# Patient Record
Sex: FEMALE | Race: WHITE
[De-identification: names, ages, dates, MRNs, and addresses within clinical notes are randomized per-mention and may not be internally consistent; named-entity substitution may affect disease eponyms.]

---

## 2019-09-19 ENCOUNTER — HOSPITAL ENCOUNTER (OUTPATIENT)
Dept: HOSPITAL 92 - DTY/OP | Age: 34
Discharge: HOME | End: 2019-09-19
Attending: SURGERY
Payer: COMMERCIAL

## 2019-09-19 DIAGNOSIS — E66.01: Primary | ICD-10-CM

## 2019-09-19 PROCEDURE — 97802 MEDICAL NUTRITION INDIV IN: CPT

## 2019-12-06 ENCOUNTER — HOSPITAL ENCOUNTER (INPATIENT)
Dept: HOSPITAL 92 - SURG A | Age: 34
LOS: 14 days | Discharge: HOME | DRG: 621 | End: 2019-12-20
Attending: SURGERY | Admitting: SURGERY
Payer: COMMERCIAL

## 2019-12-06 ENCOUNTER — HOSPITAL ENCOUNTER (OUTPATIENT)
Dept: HOSPITAL 92 - LABBT | Age: 34
Discharge: HOME | End: 2019-12-06
Attending: SURGERY
Payer: COMMERCIAL

## 2019-12-06 VITALS — BODY MASS INDEX: 42.7 KG/M2

## 2019-12-06 DIAGNOSIS — E66.01: Primary | ICD-10-CM

## 2019-12-06 DIAGNOSIS — F17.200: ICD-10-CM

## 2019-12-06 DIAGNOSIS — E11.9: ICD-10-CM

## 2019-12-06 DIAGNOSIS — Z01.818: Primary | ICD-10-CM

## 2019-12-06 DIAGNOSIS — F32.9: ICD-10-CM

## 2019-12-06 DIAGNOSIS — E66.01: ICD-10-CM

## 2019-12-06 DIAGNOSIS — Z79.4: ICD-10-CM

## 2019-12-06 DIAGNOSIS — F41.9: ICD-10-CM

## 2019-12-06 DIAGNOSIS — J30.2: ICD-10-CM

## 2019-12-06 LAB
ALBUMIN SERPL BCG-MCNC: 4.6 G/DL (ref 3.5–5)
ALP SERPL-CCNC: 78 U/L (ref 40–110)
ALT SERPL W P-5'-P-CCNC: 16 U/L (ref 8–55)
ANION GAP SERPL CALC-SCNC: 13 MMOL/L (ref 10–20)
AST SERPL-CCNC: 20 U/L (ref 5–34)
BASOPHILS # BLD AUTO: 0 THOU/UL (ref 0–0.2)
BASOPHILS NFR BLD AUTO: 0.4 % (ref 0–1)
BILIRUB DIRECT SERPL-MCNC: 0.1 MG/DL (ref 0.1–0.3)
BILIRUB SERPL-MCNC: 0.3 MG/DL (ref 0.2–1.2)
BUN SERPL-MCNC: 23 MG/DL (ref 7–18.7)
CALCIUM SERPL-MCNC: 9.8 MG/DL (ref 7.8–10.44)
CHLORIDE SERPL-SCNC: 102 MMOL/L (ref 98–107)
CO2 SERPL-SCNC: 26 MMOL/L (ref 22–29)
CREAT CL PREDICTED SERPL C-G-VRATE: 0 ML/MIN (ref 70–130)
EOSINOPHIL # BLD AUTO: 0.2 THOU/UL (ref 0–0.7)
EOSINOPHIL NFR BLD AUTO: 2.5 % (ref 0–10)
GLOBULIN SER CALC-MCNC: 3 G/DL (ref 2.4–3.5)
GLUCOSE SERPL-MCNC: 98 MG/DL (ref 70–105)
HGB BLD-MCNC: 11.9 G/DL (ref 12–16)
LYMPHOCYTES # BLD: 2.2 THOU/UL (ref 1.2–3.4)
LYMPHOCYTES NFR BLD AUTO: 23.5 % (ref 21–51)
MCH RBC QN AUTO: 26.5 PG (ref 27–31)
MCV RBC AUTO: 80.9 FL (ref 78–98)
MONOCYTES # BLD AUTO: 0.8 THOU/UL (ref 0.11–0.59)
MONOCYTES NFR BLD AUTO: 8.8 % (ref 0–10)
NEUTROPHILS # BLD AUTO: 5.9 THOU/UL (ref 1.4–6.5)
NEUTROPHILS NFR BLD AUTO: 64.9 % (ref 42–75)
PLATELET # BLD AUTO: 362 THOU/UL (ref 130–400)
POTASSIUM SERPL-SCNC: 4 MMOL/L (ref 3.5–5.1)
PREGS CONTROL BACKGROUND?: (no result)
PREGS CONTROL BAR APPEAR?: YES
RBC # BLD AUTO: 4.5 MILL/UL (ref 4.2–5.4)
SODIUM SERPL-SCNC: 137 MMOL/L (ref 136–145)
WBC # BLD AUTO: 9.2 THOU/UL (ref 4.8–10.8)

## 2019-12-06 PROCEDURE — 74241: CPT

## 2019-12-06 PROCEDURE — G0008 ADMIN INFLUENZA VIRUS VAC: HCPCS

## 2019-12-06 PROCEDURE — 84703 CHORIONIC GONADOTROPIN ASSAY: CPT

## 2019-12-06 PROCEDURE — C9113 INJ PANTOPRAZOLE SODIUM, VIA: HCPCS

## 2019-12-06 PROCEDURE — 80076 HEPATIC FUNCTION PANEL: CPT

## 2019-12-06 PROCEDURE — S0028 INJECTION, FAMOTIDINE, 20 MG: HCPCS

## 2019-12-06 PROCEDURE — 36416 COLLJ CAPILLARY BLOOD SPEC: CPT

## 2019-12-06 PROCEDURE — S0020 INJECTION, BUPIVICAINE HYDRO: HCPCS

## 2019-12-06 PROCEDURE — 36415 COLL VENOUS BLD VENIPUNCTURE: CPT

## 2019-12-06 PROCEDURE — 80053 COMPREHEN METABOLIC PANEL: CPT

## 2019-12-06 PROCEDURE — 88307 TISSUE EXAM BY PATHOLOGIST: CPT

## 2019-12-06 PROCEDURE — 83036 HEMOGLOBIN GLYCOSYLATED A1C: CPT

## 2019-12-06 PROCEDURE — 85025 COMPLETE CBC W/AUTO DIFF WBC: CPT

## 2019-12-06 PROCEDURE — 71046 X-RAY EXAM CHEST 2 VIEWS: CPT

## 2019-12-06 PROCEDURE — 90471 IMMUNIZATION ADMIN: CPT

## 2019-12-06 PROCEDURE — 90686 IIV4 VACC NO PRSV 0.5 ML IM: CPT

## 2019-12-06 PROCEDURE — 80048 BASIC METABOLIC PNL TOTAL CA: CPT

## 2019-12-06 PROCEDURE — 88312 SPECIAL STAINS GROUP 1: CPT

## 2019-12-06 NOTE — RAD
Chest 2 views



HISTORY: Preop.



FINDINGS: Cardiac silhouette and pulmonary vasculature are unremarkable. Mediastinum is midline. No c
onfluent airspace consolidation, pneumothorax, or pleural fluid.











IMPRESSION: Normal exam.



Reported By: MEJIA Daly 

Electronically Signed:  12/6/2019 5:56 PM

## 2019-12-19 PROCEDURE — 0DB64Z3 EXCISION OF STOMACH, PERCUTANEOUS ENDOSCOPIC APPROACH, VERTICAL: ICD-10-PCS | Performed by: SURGERY

## 2019-12-19 RX ADMIN — POTASSIUM CHLORIDE AND SODIUM CHLORIDE SCH MLS: 450; 150 INJECTION, SOLUTION INTRAVENOUS at 21:19

## 2019-12-19 RX ADMIN — CEFAZOLIN SODIUM SCH MLS: 2 SOLUTION INTRAVENOUS at 21:20

## 2019-12-19 RX ADMIN — CEFAZOLIN SODIUM SCH MLS: 2 SOLUTION INTRAVENOUS at 15:31

## 2019-12-19 RX ADMIN — POTASSIUM CHLORIDE AND SODIUM CHLORIDE SCH MLS: 450; 150 INJECTION, SOLUTION INTRAVENOUS at 11:23

## 2019-12-19 RX ADMIN — POTASSIUM CHLORIDE AND SODIUM CHLORIDE SCH MLS: 450; 150 INJECTION, SOLUTION INTRAVENOUS at 17:33

## 2019-12-19 NOTE — HP
CHIEF COMPLAINT:  Morbid obesity.



HISTORY OF PRESENT ILLNESS:  The patient is a 34-year-old female, who has been

morbidly obese for many years, attempted multiple weight loss programs without

success.  She is here for sleeve gastrectomy. 



PAST MEDICAL HISTORY:  Significant for depression, anxiety, seasonal allergies, and

diabetes. 



PAST SURGICAL HISTORY:  Tonsillectomy,  section, and tubal reversal.



MEDICATIONS:  

1. BD pen needle insulin.

2. Tradjenta.



FAMILY HISTORY:  Skin cancer, hearing loss, and hypothyroidism.



SOCIAL HISTORY:  She is .  No alcohol or tobacco.



ALLERGIES:  SHE HAS NO KNOWN DRUG ALLERGIES.



PHYSICAL EXAMINATION:

VITAL SIGNS:  Height 64, weight 253, body mass index 43.5. 

GENERAL:  Well-developed, well-nourished female, in no apparent distress. 

HEENT:  No alopecia.  Pupils equal, round, and reactive.  Extraocular motor intact.

Pharynx clear.  Good dentition. 

NECK:  Supple.  No thyroid masses.  No carotid bruits. 

LUNGS:  Clear. 

HEART:  Regular rate and rhythm. 

BREASTS:  No palpable breast masses.  No lymphadenopathy.  No skin changes. 

ABDOMEN:  Soft, nondistended, and nontender.  No masses or hernias. 

EXTREMITIES:  Good pulses.  No pedal edema.



ASSESSMENT:  Morbid obesity with comorbidities.



PLAN:  Laparoscopic sleeve gastrectomy.



CONSENT:  I have discussed planned procedure as well as risk of bleeding, infection,

injury to esophagus, spleen, loops of bowel, need to open, also leakage from staple

line.  She understands and gives informed consent. 







Job ID:  [id number]

## 2019-12-19 NOTE — OP
DATE OF PROCEDURE:  12/19/2019



PREOPERATIVE DIAGNOSIS:  Morbid obesity.



PROCEDURES PERFORMED:  Laparoscopic sleeve gastrectomy, esophagogastroscopy.



INDICATIONS:  A 34-year-old female morbidly obese, who has attempted multiple weight

loss programs without success. 



FINDINGS:  A 38-Kosovan bougie used.



DESCRIPTION OF PROCEDURE:  After informed consent was obtained, the patient was

taken to the operating room, given general endotracheal anesthesia and placed in

supine position.  Abdomen was prepped and draped in usual fashion.  Local anesthesia

was infiltrated subcutaneously and deep, and a 12 mm incision was performed

approximately 8-inch below the xiphoid slight to left.  Veress needle inserted.

Drop test performed.  Pneumoperitoneum was created to a volume of 2 L of carbon

dioxide.  Utilizing a bladeless 12 mm trocar and 0-degree laparoscope, direct visual

entry into the abdominal cavity was performed.  Pneumoperitoneum was created to a

pressure of 15 mmHg, and the patient was placed in steep reverse Trendelenburg

position.  Ameena liver retractor was inserted.  Left lobe of the liver retracted

superiorly.  Pylorus identified a 12 mm port placed on the right beneath it and two

12s placed in left subcostal.  The omentum was taken off the greater curvature 5 cm

from the pylorus utilizing the LigaSure.  Short gastrics divided with LigaSure, and

left crura defined with LigaSure.  A 38-K[place] bougie was inserted, directed into

the antrum.  The linear 60 mm green load stapler was used to divide the antrum to

the bougie, gold load along the bougie, and a series of blues through the angle of

His.  Intraoperative endoscopy was performed.  The video endoscope was inserted

under direct vision.  Staple line inspected.  There was no bleeding.  Staple line

was then tested by inflating the new stomach with pressurized air under water, there

was no air leak.  Stomach was decompressed.  Scope was removed.  The remnant stomach

was removed from the abdomen through the left lateral port site. 

The fascia was closed with 0 Vicryl suture and the GraNee needle.  Trocars and

retractors were removed.  The skin was closed with interrupted 4-0 Rapide. 

Dermabond was applied.  The patient tolerated the procedure well, transferred to

Recovery in good condition.  Sponge and needle count verified correct x2. 







Job ID:  [id number]

## 2019-12-20 VITALS — SYSTOLIC BLOOD PRESSURE: 127 MMHG | DIASTOLIC BLOOD PRESSURE: 84 MMHG | TEMPERATURE: 98.1 F

## 2019-12-20 LAB
ANION GAP SERPL CALC-SCNC: 10 MMOL/L (ref 10–20)
BASOPHILS # BLD AUTO: 0 THOU/UL (ref 0–0.2)
BASOPHILS NFR BLD AUTO: 0.4 % (ref 0–1)
BUN SERPL-MCNC: 10 MG/DL (ref 7–18.7)
CALCIUM SERPL-MCNC: 8.8 MG/DL (ref 7.8–10.44)
CHLORIDE SERPL-SCNC: 106 MMOL/L (ref 98–107)
CO2 SERPL-SCNC: 25 MMOL/L (ref 22–29)
CREAT CL PREDICTED SERPL C-G-VRATE: 194 ML/MIN (ref 70–130)
EOSINOPHIL # BLD AUTO: 0 THOU/UL (ref 0–0.7)
EOSINOPHIL NFR BLD AUTO: 0.3 % (ref 0–10)
GLUCOSE SERPL-MCNC: 83 MG/DL (ref 70–105)
HGB BLD-MCNC: 10.9 G/DL (ref 12–16)
LYMPHOCYTES # BLD: 2.3 THOU/UL (ref 1.2–3.4)
LYMPHOCYTES NFR BLD AUTO: 21.3 % (ref 21–51)
MCH RBC QN AUTO: 26 PG (ref 27–31)
MCV RBC AUTO: 81.7 FL (ref 78–98)
MONOCYTES # BLD AUTO: 1 THOU/UL (ref 0.11–0.59)
MONOCYTES NFR BLD AUTO: 9.2 % (ref 0–10)
NEUTROPHILS # BLD AUTO: 7.4 THOU/UL (ref 1.4–6.5)
NEUTROPHILS NFR BLD AUTO: 68.7 % (ref 42–75)
PLATELET # BLD AUTO: 280 THOU/UL (ref 130–400)
POTASSIUM SERPL-SCNC: 4.1 MMOL/L (ref 3.5–5.1)
RBC # BLD AUTO: 4.21 MILL/UL (ref 4.2–5.4)
SODIUM SERPL-SCNC: 137 MMOL/L (ref 136–145)
WBC # BLD AUTO: 10.7 THOU/UL (ref 4.8–10.8)

## 2019-12-20 NOTE — RAD
XR UGI Single Contrast No Air



History: Postop gastric vertical sleeve



Comparison: None.



Findings: Patient was given 30 mL of gastric of contrast. There is transit through the GE junction in
to the sleeve extending into the proximal small bowel. No evidence of leak.



Impression: No evidence for leak.



Fluoroscopy time: 0.5 minutes



Reported By: Angelo Breaux 

Electronically Signed:  12/20/2019 9:01 AM